# Patient Record
Sex: FEMALE | Race: OTHER | URBAN - METROPOLITAN AREA
[De-identification: names, ages, dates, MRNs, and addresses within clinical notes are randomized per-mention and may not be internally consistent; named-entity substitution may affect disease eponyms.]

---

## 2022-04-26 ENCOUNTER — PREPPED CHART (OUTPATIENT)
Dept: URBAN - METROPOLITAN AREA CLINIC 49 | Facility: CLINIC | Age: 67
End: 2022-04-26

## 2022-04-27 ENCOUNTER — NEW PATIENT (OUTPATIENT)
Dept: URBAN - METROPOLITAN AREA CLINIC 49 | Facility: CLINIC | Age: 67
End: 2022-04-27

## 2022-04-27 DIAGNOSIS — H40.033: ICD-10-CM

## 2022-04-27 DIAGNOSIS — H25.13: ICD-10-CM

## 2022-04-27 PROCEDURE — 92002 INTRM OPH EXAM NEW PATIENT: CPT

## 2022-04-27 PROCEDURE — 66761 REVISION OF IRIS: CPT

## 2022-04-27 PROCEDURE — 92020 GONIOSCOPY: CPT

## 2022-04-27 PROCEDURE — 76514 ECHO EXAM OF EYE THICKNESS: CPT

## 2022-04-27 ASSESSMENT — PACHYMETRY
OD_CT_UM: 500
OS_CT_UM: 504

## 2022-04-27 ASSESSMENT — TONOMETRY
OS_IOP_MMHG: 11
OD_IOP_MMHG: 08
OS_IOP_MMHG: 08
OD_IOP_MMHG: 11

## 2022-04-27 ASSESSMENT — VISUAL ACUITY
OS_CC: J3@16"
OS_GLARE: 20/30
OS_GLARE: 20/50
OD_SC: CF 6FT
OS_SC: 20/400
OD_GLARE: 20/70
OS_CC: 20/40
OD_CC: J3@16"
OS_PH: 20/70
OD_CC: 20/80-1
OD_GLARE: 20/40

## 2022-04-27 NOTE — PROCEDURE NOTE: CLINICAL
PROCEDURE NOTE: YAG Peripheral Iridotomy OD. Diagnosis: Anatomic Narrow Angles. Prior to treatment, the risks/benefits/alternatives were discussed. After consent was obtained, the patient was placed in the minor laser surgical room and placed in from of the YAG laser. A series of laser spots were placed to iris to create a patient iridotomy. Pulse energy = 1.8mj. Total pulses = 40. Total energy: * mJ. Patient tolerated procedure well. Post laser instructions given. Patient will call immediately for decreased vision, eye pain, redness, or any other concerns. Post-procedure IOP = * mmHg. Mio Jenikns

## 2022-06-08 ENCOUNTER — CLINIC PROCEDURE ONLY (OUTPATIENT)
Dept: URBAN - METROPOLITAN AREA CLINIC 49 | Facility: CLINIC | Age: 67
End: 2022-06-08

## 2022-06-08 DIAGNOSIS — H40.033: ICD-10-CM

## 2022-06-08 PROCEDURE — 66761 REVISION OF IRIS: CPT

## 2022-06-08 ASSESSMENT — VISUAL ACUITY
OD_CC: 20/50
OS_CC: 20/40-1

## 2022-06-08 ASSESSMENT — TONOMETRY
OS_IOP_MMHG: 10
OD_IOP_MMHG: 09
OS_IOP_MMHG: 09
OS_IOP_MMHG: 13
OS_IOP_MMHG: 12
OD_IOP_MMHG: 12

## 2022-06-08 NOTE — PROCEDURE NOTE: CLINICAL
PROCEDURE NOTE: YAG Peripheral Iridotomy OS. Diagnosis: Anatomic Narrow Angles. Prior to treatment, the risks/benefits/alternatives were discussed. After consent was obtained, the patient was placed in the minor laser surgical room and placed in from of the YAG laser. A series of laser spots were placed to iris to create a patient iridotomy. Pulse energy = 1.4mj. Total pulses = 52. Total energy: * mJ. Patient tolerated procedure well. Post laser instructions given. Patient will call immediately for decreased vision, eye pain, redness, or any other concerns. Post-procedure IOP = * mmHg. Denice Willis

## 2022-06-22 ENCOUNTER — FOLLOW UP (OUTPATIENT)
Dept: URBAN - METROPOLITAN AREA CLINIC 49 | Facility: CLINIC | Age: 67
End: 2022-06-22

## 2022-06-22 DIAGNOSIS — H40.033: ICD-10-CM

## 2022-06-22 PROCEDURE — 92012 INTRM OPH EXAM EST PATIENT: CPT

## 2022-06-22 ASSESSMENT — TONOMETRY
OS_IOP_MMHG: 12
OS_IOP_MMHG: 09
OD_IOP_MMHG: 09
OD_IOP_MMHG: 12

## 2022-06-22 ASSESSMENT — VISUAL ACUITY
OS_PH: 20/25-2
OS_CC: 20/30-1
OD_PH: 20/30-
OD_CC: 20/40

## 2022-06-22 NOTE — PATIENT DISCUSSION
Chief Complaint   Patient presents with    Immunization/Injection     flu vaccine     38 y.o.  presents for routine immunization. Denies any symptoms, reactions or allergies that would exclude them from being immunized today. Risks and adverse reactions were discussed and VIS was given to them. All questions were answered.    Visit Vitals    Temp 98.6 °F (37 °C) (Oral) Recommend yag PI OD today then OS in 1-2 weeks. Consent signed.

## 2022-07-19 ENCOUNTER — PRE-OP/H&P (OUTPATIENT)
Dept: URBAN - METROPOLITAN AREA CLINIC 49 | Facility: CLINIC | Age: 67
End: 2022-07-19

## 2022-07-19 DIAGNOSIS — H25.13: ICD-10-CM

## 2022-07-19 PROCEDURE — PREOP PRE OP VISIT

## 2022-07-19 PROCEDURE — 92136 OPHTHALMIC BIOMETRY: CPT

## 2022-07-19 ASSESSMENT — TONOMETRY
OD_IOP_MMHG: 15
OS_IOP_MMHG: 12
OD_IOP_MMHG: 12
OS_IOP_MMHG: 15

## 2022-07-19 ASSESSMENT — VISUAL ACUITY
OD_CC: 20/40-1
OS_CC: 20/50-2

## 2022-07-19 ASSESSMENT — KERATOMETRY
OS_AXISANGLE_DEGREES: 180
OD_K1POWER_DIOPTERS: 40.87
OD_K2POWER_DIOPTERS: 40.62
OD_AXISANGLE2_DEGREES: 069
OS_K2POWER_DIOPTERS: 40.50
OD_AXISANGLE_DEGREES: 159
OS_AXISANGLE2_DEGREES: 90
OS_K1POWER_DIOPTERS: 41.37

## 2022-08-04 ENCOUNTER — SURGERY/PROCEDURE (OUTPATIENT)
Dept: URBAN - METROPOLITAN AREA SURGERY 16 | Facility: SURGERY | Age: 67
End: 2022-08-04

## 2022-08-04 ENCOUNTER — POST-OP (OUTPATIENT)
Dept: URBAN - METROPOLITAN AREA CLINIC 49 | Facility: CLINIC | Age: 67
End: 2022-08-04

## 2022-08-04 DIAGNOSIS — H25.11: ICD-10-CM

## 2022-08-04 DIAGNOSIS — Z98.41: ICD-10-CM

## 2022-08-04 DIAGNOSIS — Z96.1: ICD-10-CM

## 2022-08-04 PROCEDURE — 66984CV REMOVE CATARACT, INSERT LENS, CUSTOM VISION

## 2022-08-04 PROCEDURE — 99199PCLA CLASSIC VISION PACKAGE

## 2022-08-04 ASSESSMENT — KERATOMETRY
OD_AXISANGLE2_DEGREES: 069
OD_K1POWER_DIOPTERS: 40.87
OS_K1POWER_DIOPTERS: 41.37
OS_K1POWER_DIOPTERS: 41.37
OD_K1POWER_DIOPTERS: 40.87
OD_K2POWER_DIOPTERS: 40.62
OS_K2POWER_DIOPTERS: 40.50
OS_AXISANGLE_DEGREES: 180
OD_K2POWER_DIOPTERS: 40.62
OD_AXISANGLE2_DEGREES: 069
OS_K2POWER_DIOPTERS: 40.50
OS_AXISANGLE_DEGREES: 180
OD_AXISANGLE_DEGREES: 159
OS_AXISANGLE2_DEGREES: 90
OS_AXISANGLE2_DEGREES: 90
OD_AXISANGLE_DEGREES: 159

## 2022-08-04 ASSESSMENT — TONOMETRY: OD_IOP_MMHG: 23

## 2022-08-04 ASSESSMENT — VISUAL ACUITY: OD_SC: 20/80

## 2022-08-09 ENCOUNTER — POST OP/EVAL OF SECOND EYE (OUTPATIENT)
Dept: URBAN - METROPOLITAN AREA CLINIC 49 | Facility: CLINIC | Age: 67
End: 2022-08-09

## 2022-08-09 DIAGNOSIS — H43.811: ICD-10-CM

## 2022-08-09 DIAGNOSIS — Z98.41: ICD-10-CM

## 2022-08-09 DIAGNOSIS — H25.12: ICD-10-CM

## 2022-08-09 PROCEDURE — 92136 - 2N OPHTHALMIC BIOMETRY BY PARTIAL COHERENCE INTERFEROMETRY WITH INTRAOCULAR LENS POWER CALCULATION

## 2022-08-09 PROCEDURE — 99213 OFFICE O/P EST LOW 20 MIN: CPT

## 2022-08-09 PROCEDURE — 92134 CPTRZ OPH DX IMG PST SGM RTA: CPT

## 2022-08-09 ASSESSMENT — KERATOMETRY
OD_K2POWER_DIOPTERS: 40.62
OS_K2POWER_DIOPTERS: 40.50
OD_K1POWER_DIOPTERS: 40.87
OD_AXISANGLE2_DEGREES: 069
OS_AXISANGLE_DEGREES: 180
OS_K1POWER_DIOPTERS: 41.37
OD_AXISANGLE_DEGREES: 159
OS_AXISANGLE2_DEGREES: 90

## 2022-08-09 ASSESSMENT — TONOMETRY
OS_IOP_MMHG: 11
OD_IOP_MMHG: 11

## 2022-08-09 ASSESSMENT — VISUAL ACUITY
OD_SC: 20/50
OD_SC: J1
OS_PH: 20/70
OS_SC: 20/100
OD_SC: J4
OD_PH: 20/25

## 2022-08-18 ENCOUNTER — SURGERY/PROCEDURE (OUTPATIENT)
Dept: URBAN - METROPOLITAN AREA SURGERY 16 | Facility: SURGERY | Age: 67
End: 2022-08-18

## 2022-08-18 ENCOUNTER — POST-OP (OUTPATIENT)
Dept: URBAN - METROPOLITAN AREA CLINIC 49 | Facility: CLINIC | Age: 67
End: 2022-08-18

## 2022-08-18 DIAGNOSIS — Z96.1: ICD-10-CM

## 2022-08-18 DIAGNOSIS — H25.12: ICD-10-CM

## 2022-08-18 DIAGNOSIS — Z98.42: ICD-10-CM

## 2022-08-18 PROCEDURE — 99199PCLA CLASSIC VISION PACKAGE

## 2022-08-18 PROCEDURE — 66984CV REMOVE CATARACT, INSERT LENS, CUSTOM VISION

## 2022-08-18 ASSESSMENT — KERATOMETRY
OS_AXISANGLE_DEGREES: 180
OS_K2POWER_DIOPTERS: 40.50
OD_AXISANGLE2_DEGREES: 069
OS_K1POWER_DIOPTERS: 41.37
OD_AXISANGLE2_DEGREES: 069
OD_K1POWER_DIOPTERS: 40.87
OS_AXISANGLE2_DEGREES: 90
OD_K1POWER_DIOPTERS: 40.87
OS_AXISANGLE_DEGREES: 180
OD_AXISANGLE_DEGREES: 159
OS_AXISANGLE2_DEGREES: 90
OD_K2POWER_DIOPTERS: 40.62
OD_AXISANGLE_DEGREES: 159
OD_K2POWER_DIOPTERS: 40.62
OS_K2POWER_DIOPTERS: 40.50
OS_K1POWER_DIOPTERS: 41.37

## 2022-08-18 ASSESSMENT — TONOMETRY
OS_IOP_MMHG: 12
OS_IOP_MMHG: 09

## 2022-08-18 ASSESSMENT — VISUAL ACUITY: OS_SC: 20/60

## 2022-08-23 ENCOUNTER — POST-OP (OUTPATIENT)
Dept: URBAN - METROPOLITAN AREA CLINIC 49 | Facility: CLINIC | Age: 67
End: 2022-08-23

## 2022-08-23 DIAGNOSIS — Z98.42: ICD-10-CM

## 2022-08-23 ASSESSMENT — TONOMETRY
OD_IOP_MMHG: 12
OS_IOP_MMHG: 10
OD_IOP_MMHG: 9
OS_IOP_MMHG: 13

## 2022-08-23 ASSESSMENT — KERATOMETRY
OD_AXISANGLE_DEGREES: 159
OS_AXISANGLE2_DEGREES: 90
OS_AXISANGLE_DEGREES: 180
OD_K1POWER_DIOPTERS: 40.87
OS_K2POWER_DIOPTERS: 40.50
OS_K1POWER_DIOPTERS: 41.37
OD_AXISANGLE2_DEGREES: 069
OD_K2POWER_DIOPTERS: 40.62

## 2022-08-23 ASSESSMENT — VISUAL ACUITY
OS_SC: 20/30
OU_SC: J1+2
OU_SC: 20/30
OD_SC: 20/40-1
